# Patient Record
Sex: FEMALE | Race: WHITE | Employment: FULL TIME | URBAN - METROPOLITAN AREA
[De-identification: names, ages, dates, MRNs, and addresses within clinical notes are randomized per-mention and may not be internally consistent; named-entity substitution may affect disease eponyms.]

---
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
## 2021-08-11 PROBLEM — N36.1 URETHRAL DIVERTICULUM: Status: ACTIVE | Noted: 2021-08-02

## 2021-08-11 PROBLEM — Z78.9 OTHER SPECIFIED HEALTH STATUS: Status: ACTIVE | Noted: 2021-08-11

## 2021-09-22 PROBLEM — Z34.90 PREGNANCY: Status: ACTIVE | Noted: 2021-09-22
